# Patient Record
Sex: MALE | Race: OTHER | URBAN - METROPOLITAN AREA
[De-identification: names, ages, dates, MRNs, and addresses within clinical notes are randomized per-mention and may not be internally consistent; named-entity substitution may affect disease eponyms.]

---

## 2021-12-17 ENCOUNTER — EMERGENCY (EMERGENCY)
Facility: HOSPITAL | Age: 31
LOS: 0 days | Discharge: HOME | End: 2021-12-17
Attending: STUDENT IN AN ORGANIZED HEALTH CARE EDUCATION/TRAINING PROGRAM | Admitting: STUDENT IN AN ORGANIZED HEALTH CARE EDUCATION/TRAINING PROGRAM
Payer: MEDICAID

## 2021-12-17 VITALS
RESPIRATION RATE: 18 BRPM | SYSTOLIC BLOOD PRESSURE: 134 MMHG | WEIGHT: 190.04 LBS | HEART RATE: 98 BPM | OXYGEN SATURATION: 98 % | TEMPERATURE: 99 F | DIASTOLIC BLOOD PRESSURE: 76 MMHG

## 2021-12-17 DIAGNOSIS — F10.929 ALCOHOL USE, UNSPECIFIED WITH INTOXICATION, UNSPECIFIED: ICD-10-CM

## 2021-12-17 DIAGNOSIS — R06.02 SHORTNESS OF BREATH: ICD-10-CM

## 2021-12-17 DIAGNOSIS — R07.9 CHEST PAIN, UNSPECIFIED: ICD-10-CM

## 2021-12-17 DIAGNOSIS — R09.81 NASAL CONGESTION: ICD-10-CM

## 2021-12-17 PROCEDURE — 99284 EMERGENCY DEPT VISIT MOD MDM: CPT

## 2021-12-17 RX ORDER — ALBUTEROL 90 UG/1
2 AEROSOL, METERED ORAL EVERY 6 HOURS
Refills: 0 | Status: DISCONTINUED | OUTPATIENT
Start: 2021-12-17 | End: 2021-12-17

## 2021-12-17 RX ADMIN — ALBUTEROL 2 PUFF(S): 90 AEROSOL, METERED ORAL at 04:45

## 2021-12-17 NOTE — ED ADULT NURSE REASSESSMENT NOTE - NS ED NURSE REASSESS COMMENT FT1
Pt extremely uncooperative, threatening staff, eloped with father. Walked out with steady gait, no dyspena on exertion noted. No IV access. Pt alert & oriented. Extremely uncooperative, & threatening staff, pt seen by MD Morrell & Bakari. Albuterol inhaler given at bedside & no further orders placed. Shortly after pt eloped with father. Walked out with steady gait, no dyspnea on exertion noted. No IV access.

## 2021-12-17 NOTE — ED PROVIDER NOTE - OBJECTIVE STATEMENT
Pt is a 32 y/o male with no stated PMH presenting for congestion. Pt says whenever he drinks alcohol, he feels short of breath and congested. Reports he drank alcohol earlier this morning. Denies fever, chest pain or abdominal pain.

## 2021-12-17 NOTE — ED ADULT TRIAGE NOTE - NS ED NURSE DIRECT TO ROOM YN
[FreeTextEntry3] : I personally saw and examined ROBSON DOWNS in detail. I spoke to DANN Deleon regarding the assessment and plan of care. I reviewed the above assessment and plan of care, and agree. I have made changes in changes in the body of the note where appropriate.I personally reviewed the HPI, PMH, FH, SH, ROS and medications/allergies. I have spoken to DANN Deleon regarding the history and have personally determined the assessment and plan of care, and documented this myself. I was present and participated in all key portions of the encounter and all procedures noted above. I have made changes in the body of the note where appropriate.\par \par Attesting Faculty: See Attending Signature Below \par \par \par  [Time Spent: ___ minutes] : I have spent [unfilled] minutes of time on the encounter. No

## 2021-12-17 NOTE — ED PROVIDER NOTE - NS ED ROS FT
Review of Systems:  CONSTITUTIONAL: No fever, No diaphoresis, No weight change  SKIN: No rash  HEMATOLOGIC: No abnormal bleeding or bruising  EYES: No eye pain, No blurred vision  ENT: + congestion, No change in hearing, No sore throat, No neck pain, No ear pain  RESPIRATORY: No shortness of breath, No cough  CARDIAC: + chest pain, No palpitations  GI: No abdominal pain, No nausea, No vomiting, No diarrhea, No constipation, No bright red blood per rectum or melena. No flank pain  : No dysuria, frequency, hematuria.   ENDO: No polydypsia, No polyuria, No heat/cold intolerance  MUSCULOSKELETAL: No joint paint, No swelling, No back pain  NEUROLOGIC: No numbness, No focal weakness, No headache, No dizziness  All other systems negative, unless specified in HPI

## 2021-12-17 NOTE — ED PROVIDER NOTE - PHYSICAL EXAMINATION
CONST: pt pacing around his stretcher yelling cursronan  HEAD:  normocephalic, atraumatic  EYES:  conjunctivae without injection, drainage or discharge  ENMT:  tympanic membranes pearly gray with normal landmarks; nasal mucosa moist; mouth moist without ulcerations or lesions; throat moist without erythema, exudate, ulcerations or lesions  NECK:  supple, no masses, no significant lymphadenopathy  CARDIAC:  regular rate and rhythm, normal S1 and S2, no murmurs, rubs or gallops  RESP:  respiratory rate and effort appear normal for age; lungs are clear to auscultation bilaterally; no rales or wheezes  ABDOMEN:  soft, nontender, nondistended, no masses, no organomegaly  LYMPHATICS:  no significant lymphadenopathy  MUSCULOSKELETAL/NEURO:  CN II-XII grossly intact, 5/5 strength of all extremities, normal movement, normal tone  SKIN:  normal skin color for age and race, well-perfused; warm and dry

## 2021-12-17 NOTE — ED ADULT TRIAGE NOTE - CHIEF COMPLAINT QUOTE
pt complaining of feeling congested, with stuffy nose and is intoxicated. pt took a covid test yesterday and it was negative.